# Patient Record
Sex: MALE
[De-identification: names, ages, dates, MRNs, and addresses within clinical notes are randomized per-mention and may not be internally consistent; named-entity substitution may affect disease eponyms.]

---

## 2018-04-16 ENCOUNTER — HOSPITAL ENCOUNTER (EMERGENCY)
Dept: HOSPITAL 25 - UCCORT | Age: 33
Discharge: HOME | End: 2018-04-16
Payer: MEDICAID

## 2018-04-16 VITALS — SYSTOLIC BLOOD PRESSURE: 168 MMHG | DIASTOLIC BLOOD PRESSURE: 83 MMHG

## 2018-04-16 DIAGNOSIS — R41.82: Primary | ICD-10-CM

## 2018-04-16 DIAGNOSIS — R51: ICD-10-CM

## 2018-04-16 DIAGNOSIS — R56.9: ICD-10-CM

## 2018-04-16 DIAGNOSIS — F17.200: ICD-10-CM

## 2018-04-16 PROCEDURE — 99212 OFFICE O/P EST SF 10 MIN: CPT

## 2018-04-16 PROCEDURE — 93005 ELECTROCARDIOGRAM TRACING: CPT

## 2018-04-16 PROCEDURE — G0463 HOSPITAL OUTPT CLINIC VISIT: HCPCS

## 2018-04-16 NOTE — UC
General HPI





- HPI Summary


HPI Summary: 





Pt presents to  with wife.  Pt states approx 4pm was walking to store with 

cousin. Pt state he thinks he had a seizure. Pt states he does not know what 

happened but his cousin states he started to stare and became weak. Pt states 

his cousin helped him and he "did not fall." Pt states when he "came around" he 

was sitting on the floor. No seizure activity reported. Pt has had a h/o 

seizures - last 2 years ago. Pt states in the past his seizures were from 

synthetic marijuana - has not used recently. Pt states he is currently on 

Suboxone x 1 month. Pt states it doesn't help  - this morning he injected it -

approx 8am. Pt denies using other sx. No injury, no incontience. Pt states does 

not have a neurologist currently. Pt states was prevously given Rx for Keppra - 

but did not take it.  Pt denies any injury. reports mild ha. no recent illness. 

no fever chills. no cp, sob, abd pain. No ha, vision change





Pt's medications reviewed this visit





- History of Current Complaint


Chief Complaint: UCSeizure


Stated Complaint: POSSIBLE SIEZURE


Time Seen by Provider: 04/16/18 18:04


Hx Obtained From: Patient, Family/Caretaker


Onset/Duration: Sudden Onset


Timing: Constant


Onset Severity: Moderate


Current Severity: Mild


Pain Intensity: 3





- Allergy/Home Medications


Allergies/Adverse Reactions: 


 Allergies











Allergy/AdvReac Type Severity Reaction Status Date / Time


 


No Known Allergies Allergy   Verified 04/16/18 18:05











Home Medications: 


 Home Medications





Buprenorphine/Naloxone SL TAB* [Suboxone 8-2 mg SL TAB*] 1 tab.sl SL DAILY 04/16 /18 [History Confirmed 04/16/18]











PMH/Surg Hx/FS Hx/Imm Hx


Previously Healthy: Yes


Psychological History: Depression, Other


Other Psychological History: substance dependence





- Surgical History


Surgical History: None





- Social History


Occupation: Unemployed - starting [employer]ing this week


Lives: With Family


Alcohol Use: None


Substance Use Type: Other - h/o heroin; suboxone


Smoking Status (MU): Heavy Every Day Tobacco Smoker





Review of Systems


Constitutional: Negative


Skin: Negative


Eyes: Negative


ENT: Negative


Respiratory: Negative


Cardiovascular: Negative


Gastrointestinal: Negative


Genitourinary: Negative


Neurological: Headache, Other - possible sz


All Other Systems Reviewed And Are Negative: Yes





Physical Exam


Triage Information Reviewed: Yes


Appearance: Well-Appearing, No Pain Distress, Well-Nourished


Vital Signs: 


 Initial Vital Signs











Temp  97.5 F   04/16/18 18:01


 


Pulse  102   04/16/18 18:01


 


Resp  16   04/16/18 18:01


 


BP  168/83   04/16/18 18:01


 


Pulse Ox  97   04/16/18 18:01











Vital Signs Reviewed: Yes


Eye Exam: Normal


Eyes: Positive: Conjunctiva Clear


ENT Exam: Normal


ENT: Positive: Normal ENT inspection, Hearing grossly normal, Pharynx normal, 

TMs normal, Uvula midline


Dental Exam: Normal


Neck exam: Normal


Neck: Positive: Supple, Nontender, No Lymphadenopathy


Respiratory Exam: Normal


Respiratory: Positive: Lungs clear, Normal breath sounds, No respiratory 

distress, No accessory muscle use


Cardiovascular Exam: Normal


Cardiovascular: Positive: RRR, No Murmur


Abdominal Exam: Normal


Abdomen Description: Positive: Nontender, No Organomegaly, Soft


Bowel Sounds: Positive: Present


Musculoskeletal Exam: Normal


Musculoskeletal: Positive: Strength Intact


Neurological Exam: Normal


Neurological: Positive: Alert, Muscle Tone Normal


Psychological Exam: Normal


Psychological: Positive: Normal Response To Family


Skin: Positive: Other - scars b/l forearms -no acute injuries





Diagnostics





- EKG


Cardiac Rate: NL


Cardiac Rhythm: Sinus: Normal


Ectopy: None


ST Segment: Normal





Course/Dx





- Course


Course Of Treatment: Pt with epsiode of mental status change approx 1 hour PTA. 

Pt with h/o grandmal sz - no sz activity noted during event. Pt is atbaseline 

but reports mild HA. Pt with a h/o substance use - injected suboxone this am 

but denies substance use at time of event.  EKG non diagnostic for acute 

process  [facility] 112.  Recommend pt to ED for further evaluation and treatment.  

Pt requesitng [facility] - requesting wife drive.  spoke with SANDRA Costa - expecting 

pt.  pt notified may be 2hour wait, no neurology services if indicated.  pt 

states understanding and agreement [facility] plan.  will d/w with plan for ED 

evaluations





- Differential Dx - Multi-Symptom


Provider Diagnoses: mental status change resolved





Discharge





- Sign-Out/Discharge


Documenting (check all that apply): Discharge





- Discharge Plan


Condition: Stable


Disposition: HOME


Patient Education Materials:  Altered Mental Status (ED)


Referrals: 


Zoila Kathleen PA [Physician Assistant] - 


Additional Instructions: 


After discussion, the doctor that evaluated you today recommends you go to the 

emergency department for further evaluation. You have agreed to go to [facility]. They are expecting you. You are aware there is a prolonged 

wait today. 





Go directly to the emergency department.  


It is also very important you keep your appointment with the [facility] 

network as previously scheduled


Do NOT drive, operate machinery, drink alcohol, swim alone, or climb heights 

until you are seen in follow-up. 








- Billing Disposition and Condition


Condition: STABLE


Disposition: HOME